# Patient Record
Sex: FEMALE | Race: WHITE | NOT HISPANIC OR LATINO
[De-identification: names, ages, dates, MRNs, and addresses within clinical notes are randomized per-mention and may not be internally consistent; named-entity substitution may affect disease eponyms.]

---

## 2021-10-21 ENCOUNTER — RESULT REVIEW (OUTPATIENT)
Age: 56
End: 2021-10-21

## 2022-05-09 ENCOUNTER — APPOINTMENT (OUTPATIENT)
Dept: ORTHOPEDIC SURGERY | Facility: CLINIC | Age: 57
End: 2022-05-09
Payer: COMMERCIAL

## 2022-05-09 VITALS — WEIGHT: 192 LBS | BODY MASS INDEX: 34.02 KG/M2 | HEIGHT: 63 IN

## 2022-05-09 DIAGNOSIS — M25.512 PAIN IN LEFT SHOULDER: ICD-10-CM

## 2022-05-09 DIAGNOSIS — Z78.9 OTHER SPECIFIED HEALTH STATUS: ICD-10-CM

## 2022-05-09 DIAGNOSIS — M75.52 BURSITIS OF LEFT SHOULDER: ICD-10-CM

## 2022-05-09 DIAGNOSIS — M75.42 IMPINGEMENT SYNDROME OF LEFT SHOULDER: ICD-10-CM

## 2022-05-09 DIAGNOSIS — Z98.890 OTHER SPECIFIED POSTPROCEDURAL STATES: ICD-10-CM

## 2022-05-09 PROBLEM — Z00.00 ENCOUNTER FOR PREVENTIVE HEALTH EXAMINATION: Status: ACTIVE | Noted: 2022-05-09

## 2022-05-09 PROCEDURE — 99072 ADDL SUPL MATRL&STAF TM PHE: CPT

## 2022-05-09 PROCEDURE — 99214 OFFICE O/P EST MOD 30 MIN: CPT

## 2022-05-09 PROCEDURE — 73030 X-RAY EXAM OF SHOULDER: CPT | Mod: LT

## 2022-05-09 NOTE — PHYSICAL EXAM
[de-identified] : Constitutional: The general appearance of the patient is well developed, well nourished, no deformities and well groomed. Normal \par \par Gait: Gait and function is as follows: normal gait. \par \par Skin: Head and neck visualized skin is normal. Left upper extremity visualized skin is normal. Right upper extremity visualized skin is normal. Thoracic Skin of the thoracic spine shows visualized skin is normal. \par \par Cardiovascular: palpable radial pulse bilaterally, good capillary refill in digits of the bilateral upper extremities and no temperature or color changes in the bilateral upper extremities. \par \par Lymphatic: Normal Palpation of lymph nodes in the cervical. \par \par Neurologic: fine motor control in the bilateral upper extremities is intact. Deep Tendon Reflexes in Upper and Lower Extremities Negative Wilson's in the bilateral upper extremities. The patient is oriented to time, place and person. Sensation to light touch intact in the bilateral upper extremities. Mood and Affect is normal. \par \par Right Shoulder: Inspection of the shoulder/upper arm is as follows: no scapula winging, no biceps deformity and no AC joint deformity. Palpation of the shoulder/upper arm is as follows: There is tenderness at the proximal biceps tendon. Range of motion of the shoulder is as follows: Pain with internal rotation, external rotation, abduction and forward flexion. Strength of the shoulder is as follows: Supraspinatus 4/5. External Rotation 4/5. Internal Rotation 4/5. Infrafraspinatus 5/5 4/5. Deltoid 5/5 Ligament Stability and Special Tests of the shoulder is as follows: Neer test is positive. Rowe' test is positive. \par \par Left Shoulder: Inspection of the shoulder/upper arm is as follows: no scapula winging, no biceps deformity and no AC joint deformity. Inspection of the wound reveals healed incision, no sign of infection. Palpation of the shoulder/upper arm is as follows: There is tenderness at the AC joint and proximal biceps tendon. Range of motion of the shoulder is as follows: Limited range of motion compatible with recent surgery. Strength of the shoulder is as follows: Supraspinatus 4/5. External Rotation 4/5. Internal Rotation 4/5. Infraspinatus 5/5 4/5. Deltoid 5/5 Ligament Stability and Special Tests of the shoulder is as follows: stable shoulder. \par \par \par Neck: \par Inspection / Palpation of the cervical spine is as follows: There is a full, pain free, stable range of motion of the cervical spine with normal tone and no tenderness to palpation. \par \par Back, including spine: Inspection / Palpation of the thoracic/lumbar spine is as follows: There is a full, pain free, stable range of motion of the thoracic spine with a normal tone and not tenderness to palpation.. \par \par \par  [Left] : left shoulder [There are no fractures, subluxations or dislocations. No significant abnormalities are seen] : There are no fractures, subluxations or dislocations. No significant abnormalities are seen [No loss of surgical correlation. Bony alignment acceptable. Hardware in appropriate position] : No loss of surgical correlation. Bony alignment acceptable. Hardware in appropriate position [Type 2 acromion] : Type 2 acromion

## 2022-05-09 NOTE — DISCUSSION/SUMMARY
[de-identified] : 57yo female who is  7 months removed from left shoulder single anchor rotator cuff repair, cyst decompression,\par SAD and mini open biceps tenodesis.\par Overall patient continues to improve as expected.\par At this point she is capable of returning to work full duty with no restrictions as of 2/20/22.\par Patient will transition to HEP\par Patient will follow up in 6 weeks.\par X-rays demonstrated no calcific tendinitis or hardware issues.\par (1) We discussed a comprehensive treatment plans that included a prescription management plan involving the\par use of prescription strength medications to include Ibuprofen 600-800 mg TID, versus 500-650 mg Tylenol. We\par also discussed prescribing topical diclofenac (Voltaren gel) as well as once daily Meloxicam 15 mg.\par (2) The patient has More Than One chronic injuries/illnesses as outlined, discussed, and documented by ICD 10\par codes listed, as well as the HPI and Plan section.\par There is a moderate risk of morbidity with further treatment, especially from use of prescription strength\par medications and possible side effects of these medications which include upset stomach and cardiac/renal issues\par with long term use were discussed.\par (3) I recommended that the patient follow-up with their medical physician to discuss any significant specific\par potential issues with long term use such as interactions with current medications or with exacerbation of\par underlying medical morbidities.\par
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
## 2023-09-20 ENCOUNTER — OFFICE (OUTPATIENT)
Facility: LOCATION | Age: 58
Setting detail: OPHTHALMOLOGY
End: 2023-09-20
Payer: COMMERCIAL

## 2023-09-20 PROBLEM — H35.033 HYPERTENSIVE RETINOPATHY; BOTH EYES: Status: ACTIVE | Noted: 2023-09-20

## 2023-09-20 PROBLEM — H25.13 CATARACT SENILE NUCLEAR SCLEROSIS; BOTH EYES: Status: ACTIVE | Noted: 2023-09-20

## 2023-09-20 PROBLEM — H52.03 HYPEROPIA; BOTH EYES: Status: ACTIVE | Noted: 2023-09-20

## 2023-09-20 PROBLEM — H43.393 VITREOUS FLOATERS; BOTH EYES: Status: ACTIVE | Noted: 2023-09-20

## 2023-09-20 PROCEDURE — LEGACY BIL LEGACY BILLING: Performed by: OPHTHALMOLOGY

## 2023-09-20 PROCEDURE — 92015 DETERMINE REFRACTIVE STATE: CPT | Performed by: OPHTHALMOLOGY

## 2023-09-20 ASSESSMENT — CONFRONTATIONAL VISUAL FIELD TEST (CVF)
OS_FINDINGS: FULL
OD_FINDINGS: FULL

## 2023-09-20 ASSESSMENT — TONOMETRY
OD_IOP_MMHG: 18
OS_IOP_MMHG: 18

## 2023-09-26 ENCOUNTER — OFFICE (OUTPATIENT)
Facility: LOCATION | Age: 58
Setting detail: OPHTHALMOLOGY
End: 2023-09-26

## 2023-09-26 PROCEDURE — 92015 DETERMINE REFRACTIVE STATE: CPT | Performed by: OPHTHALMOLOGY

## 2023-09-26 PROCEDURE — LEGACY BIL LEGACY BILLING: Performed by: OPHTHALMOLOGY

## 2023-09-27 PROBLEM — H35.013 CHANGES IN RETINAL VASCULAR APPEARANCE; BOTH EYES: Status: ACTIVE | Noted: 2023-09-20

## 2023-10-03 ASSESSMENT — REFRACTION_MANIFEST
OD_VA2: 20/20(J1+)
OD_AXIS: 175
OD_ADD: +2.50
OS_VA2: 20/20(J1+)
OS_AXIS: 10
OD_CYLINDER: +1.00
OS_CYLINDER: +1.00
OS_SPHERE: +1.00
OS_ADD: +2.50
OD_SPHERE: +1.50
OS_VA1: 20/20
OD_VA1: 20/20

## 2023-10-03 ASSESSMENT — REFRACTION_AUTOREFRACTION
OD_CYLINDER: +1.75
OS_SPHERE: +1.50
OS_AXIS: 4
OS_CYLINDER: +0.50
OD_SPHERE: +1.75
OD_AXIS: 171

## 2023-10-03 ASSESSMENT — REFRACTION_CURRENTRX
OS_CYLINDER: +0.75
OS_AXIS: 7
OD_OVR_VA: 20/
OD_SPHERE: +1.25
OD_AXIS: 8
OS_OVR_VA: 20/
OS_ADD: +2.25
OD_CYLINDER: +0.50
OS_SPHERE: +0.75
OD_ADD: +2.25
OS_VPRISM_DIRECTION: PROGS
OD_VPRISM_DIRECTION: PROGS

## 2023-10-03 ASSESSMENT — VISUAL ACUITY
OD_BCVA: 20/20-2
OS_BCVA: 20/25+2

## 2023-10-03 ASSESSMENT — SPHEQUIV_DERIVED
OS_SPHEQUIV: 1.75
OD_SPHEQUIV: 2
OS_SPHEQUIV: 1.5
OD_SPHEQUIV: 2.625

## 2024-03-26 ENCOUNTER — OFFICE (OUTPATIENT)
Facility: LOCATION | Age: 59
Setting detail: OPHTHALMOLOGY
End: 2024-03-26
Payer: COMMERCIAL

## 2024-03-26 DIAGNOSIS — H40.013: ICD-10-CM

## 2024-03-26 DIAGNOSIS — H25.13: ICD-10-CM

## 2024-03-26 DIAGNOSIS — H35.033: ICD-10-CM

## 2024-03-26 PROCEDURE — 92014 COMPRE OPH EXAM EST PT 1/>: CPT | Performed by: OPHTHALMOLOGY

## 2024-03-26 PROCEDURE — 92250 FUNDUS PHOTOGRAPHY W/I&R: CPT | Performed by: OPHTHALMOLOGY

## 2024-03-26 ASSESSMENT — REFRACTION_MANIFEST
OD_VA2: 20/20(J1+)
OD_AXIS: 175
OS_AXIS: 10
OS_VA1: 20/20
OS_SPHERE: +1.00
OS_ADD: +2.50
OD_SPHERE: +1.50
OD_VA1: 20/20
OD_ADD: +2.50
OD_CYLINDER: +1.00
OS_CYLINDER: +1.00
OS_VA2: 20/20(J1+)

## 2024-03-26 ASSESSMENT — REFRACTION_CURRENTRX
OD_AXIS: 176
OD_CYLINDER: +0.75
OD_ADD: +2.50
OS_SPHERE: +1.00
OS_CYLINDER: +1.00
OS_OVR_VA: 20/
OD_VPRISM_DIRECTION: PROGS
OS_AXIS: 007
OS_ADD: +2.50
OD_SPHERE: +1.75
OD_OVR_VA: 20/
OS_VPRISM_DIRECTION: PROGS

## 2024-03-26 ASSESSMENT — SPHEQUIV_DERIVED
OS_SPHEQUIV: 1.5
OD_SPHEQUIV: 2

## 2024-08-05 ENCOUNTER — TRANSCRIPTION ENCOUNTER (OUTPATIENT)
Age: 59
End: 2024-08-05

## 2024-08-05 ENCOUNTER — APPOINTMENT (OUTPATIENT)
Dept: ORTHOPEDIC SURGERY | Facility: CLINIC | Age: 59
End: 2024-08-05

## 2024-08-05 PROBLEM — M25.511 ACUTE PAIN OF RIGHT SHOULDER: Status: ACTIVE | Noted: 2024-08-05

## 2024-08-05 PROBLEM — M75.51 BURSITIS OF RIGHT SHOULDER: Status: ACTIVE | Noted: 2024-08-05

## 2024-08-05 PROBLEM — M75.121 COMPLETE TEAR OF RIGHT ROTATOR CUFF: Status: ACTIVE | Noted: 2024-08-05

## 2024-08-05 PROBLEM — M75.21 TENDONITIS OF UPPER BICEPS TENDON OF RIGHT SHOULDER: Status: ACTIVE | Noted: 2024-08-05

## 2024-08-05 PROCEDURE — 99214 OFFICE O/P EST MOD 30 MIN: CPT

## 2024-08-05 PROCEDURE — 73030 X-RAY EXAM OF SHOULDER: CPT | Mod: RT

## 2024-08-05 NOTE — WORK
[Torn Ligament/Tendon/Muscle] : torn ligament, tendon or muscle [Was the competent medical cause of the injury] : was the competent medical cause of the injury [Are consistent with the injury] : are consistent with the injury [Severe Partial] : severe partial [FreeTextEntry1] : fair

## 2024-08-05 NOTE — HISTORY OF PRESENT ILLNESS
[de-identified] : 59-year-old female presenting for repeat evaluation of right shoulder pain from original work injury that occurred 9/23/2020.  Patient was employed at [employer] At the time of the injury continues to work full duty with no restrictions.  She was initially under the care of Dr. Boyce and has been treated with multiple cortisone injections as well as physical therapy.  She was previously evaluated September 2021 in which she was indicated for right shoulder arthroscopic rotator cuff repair but surgery was denied.  Overall pain has been getting progressively worse.  Pain is constant and severe.  She has noticed worsening functional weakness.  She wishes to discuss the possibility of definitive management.

## 2024-08-05 NOTE — HISTORY OF PRESENT ILLNESS
[de-identified] : 59-year-old female presenting for repeat evaluation of right shoulder pain from original work injury that occurred 9/23/2020.  Patient was employed at [employer] At the time of the injury continues to work full duty with no restrictions.  She was initially under the care of Dr. Boyce and has been treated with multiple cortisone injections as well as physical therapy.  She was previously evaluated September 2021 in which she was indicated for right shoulder arthroscopic rotator cuff repair but surgery was denied.  Overall pain has been getting progressively worse.  Pain is constant and severe.  She has noticed worsening functional weakness.  She wishes to discuss the possibility of definitive management.

## 2024-08-05 NOTE — PHYSICAL EXAM
[Right] : right shoulder [There are no fractures, subluxations or dislocations. No significant abnormalities are seen] : There are no fractures, subluxations or dislocations. No significant abnormalities are seen [Type 2 acromion] : Type 2 acromion [de-identified] : Constitutional: The general appearance of the patient is well developed, well nourished, no deformities and well groomed. Normal   Gait: Gait and function is as follows: normal gait.   Skin: Head and neck visualized skin is normal. Left upper extremity visualized skin is normal. Right upper extremity visualized skin is normal. Thoracic Skin of the thoracic spine shows visualized skin is normal.   Cardiovascular: palpable radial pulse bilaterally, good capillary refill in digits of the bilateral upper extremities and no temperature or color changes in the bilateral upper extremities.   Lymphatic: Normal Palpation of lymph nodes in the cervical.   Neurologic: fine motor control in the bilateral upper extremities is intact. Deep Tendon Reflexes in Upper and Lower Extremities Negative Wilson's in the bilateral upper extremities. The patient is oriented to time, place and person. Sensation to light touch intact in the bilateral upper extremities. Mood and Affect is normal.   Right Shoulder: Inspection of the shoulder/upper arm is as follows: Stable shoulder. Palpation of the shoulder/upper arm is as follows: There is tenderness at the AC joint, proximal biceps tendon and supraspinatus tendon. Range of motion of the shoulder is as follows: Pain with internal rotation, external rotation, abduction and forward flexion. Strength of the shoulder is as follows: Supraspinatus 4/5. External Rotation 4/5. Internal Rotation 4/5. Infraspinatus 5/5 4/5. Deltoid 5/5 Ligament Stability and Special Tests of the shoulder is as follows: Neer test is positive. Rowe' test is positive. Speed's test is positive.   Left Shoulder: Inspection of the shoulder/upper arm is as follows: There is a full, pain-free, stable range of motion of the shoulder with normal strength and no tenderness to palpation.   Neck: Inspection / Palpation of the cervical spine is as follows: There is a mild restricted range of motion of the cervical spine. Increase tone and mild tenderness to palpation. Stable ROM of cervical spine.   Back, including spine: Inspection / Palpation of the thoracic/lumbar spine is as follows: There is a full, pain free, stable range of motion of the thoracic spine with a normal tone and not tenderness to palpation..

## 2024-08-05 NOTE — PHYSICAL EXAM
[Right] : right shoulder [There are no fractures, subluxations or dislocations. No significant abnormalities are seen] : There are no fractures, subluxations or dislocations. No significant abnormalities are seen [Type 2 acromion] : Type 2 acromion [de-identified] : Constitutional: The general appearance of the patient is well developed, well nourished, no deformities and well groomed. Normal   Gait: Gait and function is as follows: normal gait.   Skin: Head and neck visualized skin is normal. Left upper extremity visualized skin is normal. Right upper extremity visualized skin is normal. Thoracic Skin of the thoracic spine shows visualized skin is normal.   Cardiovascular: palpable radial pulse bilaterally, good capillary refill in digits of the bilateral upper extremities and no temperature or color changes in the bilateral upper extremities.   Lymphatic: Normal Palpation of lymph nodes in the cervical.   Neurologic: fine motor control in the bilateral upper extremities is intact. Deep Tendon Reflexes in Upper and Lower Extremities Negative Wilson's in the bilateral upper extremities. The patient is oriented to time, place and person. Sensation to light touch intact in the bilateral upper extremities. Mood and Affect is normal.   Right Shoulder: Inspection of the shoulder/upper arm is as follows: Stable shoulder. Palpation of the shoulder/upper arm is as follows: There is tenderness at the AC joint, proximal biceps tendon and supraspinatus tendon. Range of motion of the shoulder is as follows: Pain with internal rotation, external rotation, abduction and forward flexion. Strength of the shoulder is as follows: Supraspinatus 4/5. External Rotation 4/5. Internal Rotation 4/5. Infraspinatus 5/5 4/5. Deltoid 5/5 Ligament Stability and Special Tests of the shoulder is as follows: Neer test is positive. Rowe' test is positive. Speed's test is positive.   Left Shoulder: Inspection of the shoulder/upper arm is as follows: There is a full, pain-free, stable range of motion of the shoulder with normal strength and no tenderness to palpation.   Neck: Inspection / Palpation of the cervical spine is as follows: There is a mild restricted range of motion of the cervical spine. Increase tone and mild tenderness to palpation. Stable ROM of cervical spine.   Back, including spine: Inspection / Palpation of the thoracic/lumbar spine is as follows: There is a full, pain free, stable range of motion of the thoracic spine with a normal tone and not tenderness to palpation..

## 2024-08-05 NOTE — DISCUSSION/SUMMARY
[de-identified] : 59-year-old female with ongoing right shoulder pain from original work injury that occurred 9/23/2020.  Patient was initially under the care of Dr. Boyce and has failed multiple years of physical therapy, multiple injections and activity modification. She has history of high-grade partial-thickness (near full-thickness) supraspinatus tear as well as partially tear of the long head of biceps tendon.  Previous request for surgery was denied. At this point patient has continuously failed all conservative management and reports pain has been getting progressively worse.  She is having difficulty completing activities of daily living at this point. Updated x-rays today are nondiagnostic. We are recommending MRI to rule out further progression of rotator cuff tear and atrophy in an effort to schedule surgery.  Patient will likely be amenable to arthroscopic rotator cuff repair, subacromial decompression and bicep tenodesis.  Patient will follow-up in 4 weeks for repeat evaluation and to review MRI results in an effort to discuss surgery in greater detail.  She may continue to work full duty no restrictions.  (1) We discussed a comprehensive treatment plans that included a prescription management plan involving the use of prescription strength medications to include Ibuprofen 600-800 mg TID, versus 500-650 mg Tylenol. We also discussed prescribing topical diclofenac (Voltaren gel) as well as once daily Meloxicam 15 mg.  (2) The patient has More Than One chronic injuries/illnesses as outlined, discussed, and documented by ICD 10 codes listed, as well as the HPI and Plan section. There is a moderate risk of morbidity with further treatment, especially from use of prescription strength medications and possible side effects of these medications which include upset stomach and cardiac/renal issues with long term use were discussed.  (3) I recommended that the patient follow-up with their medical physician to discuss any significant specific potential issues with long term use such as interactions with current medications or with exacerbation of underlying medical morbidities.

## 2024-08-05 NOTE — DISCUSSION/SUMMARY
[de-identified] : 59-year-old female with ongoing right shoulder pain from original work injury that occurred 9/23/2020.  Patient was initially under the care of Dr. Boyce and has failed multiple years of physical therapy, multiple injections and activity modification. She has history of high-grade partial-thickness (near full-thickness) supraspinatus tear as well as partially tear of the long head of biceps tendon.  Previous request for surgery was denied. At this point patient has continuously failed all conservative management and reports pain has been getting progressively worse.  She is having difficulty completing activities of daily living at this point. Updated x-rays today are nondiagnostic. We are recommending MRI to rule out further progression of rotator cuff tear and atrophy in an effort to schedule surgery.  Patient will likely be amenable to arthroscopic rotator cuff repair, subacromial decompression and bicep tenodesis.  Patient will follow-up in 4 weeks for repeat evaluation and to review MRI results in an effort to discuss surgery in greater detail.  She may continue to work full duty no restrictions.  (1) We discussed a comprehensive treatment plans that included a prescription management plan involving the use of prescription strength medications to include Ibuprofen 600-800 mg TID, versus 500-650 mg Tylenol. We also discussed prescribing topical diclofenac (Voltaren gel) as well as once daily Meloxicam 15 mg.  (2) The patient has More Than One chronic injuries/illnesses as outlined, discussed, and documented by ICD 10 codes listed, as well as the HPI and Plan section. There is a moderate risk of morbidity with further treatment, especially from use of prescription strength medications and possible side effects of these medications which include upset stomach and cardiac/renal issues with long term use were discussed.  (3) I recommended that the patient follow-up with their medical physician to discuss any significant specific potential issues with long term use such as interactions with current medications or with exacerbation of underlying medical morbidities.

## 2024-08-22 ENCOUNTER — RESULT REVIEW (OUTPATIENT)
Age: 59
End: 2024-08-22

## 2024-09-09 ENCOUNTER — APPOINTMENT (OUTPATIENT)
Dept: ORTHOPEDIC SURGERY | Facility: CLINIC | Age: 59
End: 2024-09-09
Payer: OTHER MISCELLANEOUS

## 2024-09-09 DIAGNOSIS — M75.21 BICIPITAL TENDINITIS, RIGHT SHOULDER: ICD-10-CM

## 2024-09-09 DIAGNOSIS — M75.51 BURSITIS OF RIGHT SHOULDER: ICD-10-CM

## 2024-09-09 DIAGNOSIS — M75.121 COMPLETE ROTATOR CUFF TEAR OR RUPTURE OF RIGHT SHOULDER, NOT SPECIFIED AS TRAUMATIC: ICD-10-CM

## 2024-09-09 DIAGNOSIS — M25.511 PAIN IN RIGHT SHOULDER: ICD-10-CM

## 2024-09-09 PROCEDURE — 99214 OFFICE O/P EST MOD 30 MIN: CPT

## 2024-09-09 NOTE — HISTORY OF PRESENT ILLNESS
[de-identified] : 59-year-old female presenting for repeat evaluation of right shoulder pain from original work injury that occurred 9/23/2020.  Patient was employed at [employer] At the time of the injury continues to work full duty with no restrictions.  She was initially under the care of Dr. oByce and has been treated with multiple cortisone injections as well as physical therapy.  She was previously evaluated September 2021 in which she was indicated for right shoulder arthroscopic rotator cuff repair but surgery was denied.  Overall pain has been getting progressively worse.  Pain is constant and severe.  She has noticed worsening functional weakness.  She wishes to discuss the possibility of definitive management.    09/09/2024: Patient here to follow up on right shoulder pain. Patient had an MRI of the right shoulder. Patient here to review MRI.

## 2024-09-09 NOTE — HISTORY OF PRESENT ILLNESS
[de-identified] : 59-year-old female presenting for repeat evaluation of right shoulder pain from original work injury that occurred 9/23/2020.  Patient was employed at [employer] At the time of the injury continues to work full duty with no restrictions.  She was initially under the care of Dr. Boyce and has been treated with multiple cortisone injections as well as physical therapy.  She was previously evaluated September 2021 in which she was indicated for right shoulder arthroscopic rotator cuff repair but surgery was denied.  Overall pain has been getting progressively worse.  Pain is constant and severe.  She has noticed worsening functional weakness.  She wishes to discuss the possibility of definitive management.    09/09/2024: Patient here to follow up on right shoulder pain. Patient had an MRI of the right shoulder. Patient here to review MRI.

## 2024-09-09 NOTE — DATA REVIEWED
[MRI] : MRI [Right] : of the right [Shoulder] : shoulder [I independently reviewed and interpreted images and report] : I independently reviewed and interpreted images and report [FreeTextEntry1] : IMPRESSION of 08/24 MRI: Full-thickness tear in anterior aspect of the supraspinatus tendon spanning 1 cm in AP dimension. Findings are new compared to previous examination. Full-thickness and high-grade tearing in the subscapularis tendon with medial subluxation of the long head of biceps tendon deep to the tear, new since previous examination.. Tendinosis in the horizontal portion of the long head of biceps tendon. Tear of the superior labrum with partial tearing the biceps labral anchor.  Severe AC joint arthrosis.

## 2024-09-09 NOTE — WORK
[Torn Ligament/Tendon/Muscle] : torn ligament, tendon or muscle [Was the competent medical cause of the injury] : was the competent medical cause of the injury [Are consistent with the injury] : are consistent with the injury [Severe Partial] : severe partial [Pulling/Pushing] : pulling/pushing [Reaching overhead] : reaching overhead [FreeTextEntry1] : fair

## 2024-09-09 NOTE — PHYSICAL EXAM
[Right] : right shoulder [There are no fractures, subluxations or dislocations. No significant abnormalities are seen] : There are no fractures, subluxations or dislocations. No significant abnormalities are seen [Type 2 acromion] : Type 2 acromion [de-identified] : Constitutional: The general appearance of the patient is well developed, well nourished, no deformities and well groomed. Normal   Gait: Gait and function is as follows: normal gait.   Skin: Head and neck visualized skin is normal. Left upper extremity visualized skin is normal. Right upper extremity visualized skin is normal. Thoracic Skin of the thoracic spine shows visualized skin is normal.   Cardiovascular: palpable radial pulse bilaterally, good capillary refill in digits of the bilateral upper extremities and no temperature or color changes in the bilateral upper extremities.   Lymphatic: Normal Palpation of lymph nodes in the cervical.   Neurologic: fine motor control in the bilateral upper extremities is intact. Deep Tendon Reflexes in Upper and Lower Extremities Negative Wilson's in the bilateral upper extremities. The patient is oriented to time, place and person. Sensation to light touch intact in the bilateral upper extremities. Mood and Affect is normal.   Right Shoulder: Inspection of the shoulder/upper arm is as follows: Stable shoulder. Palpation of the shoulder/upper arm is as follows: There is tenderness at the AC joint, proximal biceps tendon and supraspinatus tendon. Range of motion of the shoulder is as follows: Pain with internal rotation, external rotation, abduction and forward flexion. Strength of the shoulder is as follows: Supraspinatus 4/5. External Rotation 4/5. Internal Rotation 4/5. Infraspinatus 5/5 4/5. Deltoid 5/5 Ligament Stability and Special Tests of the shoulder is as follows: Neer test is positive. Rowe' test is positive. Speed's test is positive.   Left Shoulder: Inspection of the shoulder/upper arm is as follows: There is a full, pain-free, stable range of motion of the shoulder with normal strength and no tenderness to palpation.   Neck: Inspection / Palpation of the cervical spine is as follows: There is a mild restricted range of motion of the cervical spine. Increase tone and mild tenderness to palpation. Stable ROM of cervical spine.   Back, including spine: Inspection / Palpation of the thoracic/lumbar spine is as follows: There is a full, pain free, stable range of motion of the thoracic spine with a normal tone and not tenderness to palpation..

## 2024-09-09 NOTE — DISCUSSION/SUMMARY
[de-identified] : 59-year-old female with ongoing right shoulder pain from original work injury that occurred 9/23/2020.  Patient was initially under the care of Dr. Boyce and has failed multiple years of physical therapy, multiple injections and activity modification.  MRI of the right shoulder MRI demonstrates a Full-thickness tear in anterior aspect of the supraspinatus tendon spanning 1 cm in AP dimension and Full-thickness and high-grade tearing in the subscapularis tendon with medial subluxation of the long head of biceps tendon.  At this point patient has continuously failed all conservative management and reports pain has been getting progressively worse.   She is having difficulty completing activities of daily living at this point. Pt has a full thickness rotator cuff tear as injury to the biceps-labral complex and continues to report pain and weakness despite more than 3 months of conservative treatment including focused HEP/therapy, injections, anti-inflammatory medication and activity modification.   The patient is interested in pursuing surgical treatment. We had a lengthy discussion about the nature of arthroscopic rotator cuff repair surgery including the likelihood of addressing other pathology with arthroscopic vs mini-open biceps tenodesis, subacromial decompression, and extensive debridement of any pathologic tissue encountered at the time of surgery. I discussed the risks and benefits of both operative and non-operative treatment. I explained in detail the postoperative recovery including the duration of sling use and expectation for postoperative physical therapy. Explained that there is no guarantee however there is a high likelihood of functional improvement and pain relief. The patient understood all this was discussed.   The patient is indicated for a Right shoulder arthroscopic rotator cuff repair, biceps tenodesis and subacromial decompression.   * Surgery: Considering patient has failed all conservative treatment we are requesting authorization for:  - Right shoulder arthroscopic rotator cuff repair (CPT code 26880), mini open biceps biceps tenodesis (CPT 05825), debridement (CPT 91646) Subacromial decompression (75332)   Pt would like surgery (as soon as possible) The patient will require a Stone Arc 2.0 brace, cryotherapy, and 12 weeks of post-operative physical therapy The patient will require a medical clearance The doctor will require the [employer] representative, one hour, and one assistant.  She may continue to work full duty no restrictions.  (1) We discussed a comprehensive treatment plans that included a prescription management plan involving the use of prescription strength medications to include Ibuprofen 600-800 mg TID, versus 500-650 mg Tylenol. We also discussed prescribing topical diclofenac (Voltaren gel) as well as once daily Meloxicam 15 mg.  (2) The patient has More Than One chronic injuries/illnesses as outlined, discussed, and documented by ICD 10 codes listed, as well as the HPI and Plan section. There is a moderate risk of morbidity with further treatment, especially from use of prescription strength medications and possible side effects of these medications which include upset stomach and cardiac/renal issues with long term use were discussed.  (3) I recommended that the patient follow-up with their medical physician to discuss any significant specific potential issues with long term use such as interactions with current medications or with exacerbation of underlying medical morbidities.

## 2024-09-09 NOTE — PHYSICAL EXAM
[Right] : right shoulder [There are no fractures, subluxations or dislocations. No significant abnormalities are seen] : There are no fractures, subluxations or dislocations. No significant abnormalities are seen [Type 2 acromion] : Type 2 acromion [de-identified] : Constitutional: The general appearance of the patient is well developed, well nourished, no deformities and well groomed. Normal   Gait: Gait and function is as follows: normal gait.   Skin: Head and neck visualized skin is normal. Left upper extremity visualized skin is normal. Right upper extremity visualized skin is normal. Thoracic Skin of the thoracic spine shows visualized skin is normal.   Cardiovascular: palpable radial pulse bilaterally, good capillary refill in digits of the bilateral upper extremities and no temperature or color changes in the bilateral upper extremities.   Lymphatic: Normal Palpation of lymph nodes in the cervical.   Neurologic: fine motor control in the bilateral upper extremities is intact. Deep Tendon Reflexes in Upper and Lower Extremities Negative Wilson's in the bilateral upper extremities. The patient is oriented to time, place and person. Sensation to light touch intact in the bilateral upper extremities. Mood and Affect is normal.   Right Shoulder: Inspection of the shoulder/upper arm is as follows: Stable shoulder. Palpation of the shoulder/upper arm is as follows: There is tenderness at the AC joint, proximal biceps tendon and supraspinatus tendon. Range of motion of the shoulder is as follows: Pain with internal rotation, external rotation, abduction and forward flexion. Strength of the shoulder is as follows: Supraspinatus 4/5. External Rotation 4/5. Internal Rotation 4/5. Infraspinatus 5/5 4/5. Deltoid 5/5 Ligament Stability and Special Tests of the shoulder is as follows: Neer test is positive. Rowe' test is positive. Speed's test is positive.   Left Shoulder: Inspection of the shoulder/upper arm is as follows: There is a full, pain-free, stable range of motion of the shoulder with normal strength and no tenderness to palpation.   Neck: Inspection / Palpation of the cervical spine is as follows: There is a mild restricted range of motion of the cervical spine. Increase tone and mild tenderness to palpation. Stable ROM of cervical spine.   Back, including spine: Inspection / Palpation of the thoracic/lumbar spine is as follows: There is a full, pain free, stable range of motion of the thoracic spine with a normal tone and not tenderness to palpation..

## 2024-09-09 NOTE — DISCUSSION/SUMMARY
[de-identified] : 59-year-old female with ongoing right shoulder pain from original work injury that occurred 9/23/2020.  Patient was initially under the care of Dr. Boyce and has failed multiple years of physical therapy, multiple injections and activity modification.  MRI of the right shoulder MRI demonstrates a Full-thickness tear in anterior aspect of the supraspinatus tendon spanning 1 cm in AP dimension and Full-thickness and high-grade tearing in the subscapularis tendon with medial subluxation of the long head of biceps tendon.  At this point patient has continuously failed all conservative management and reports pain has been getting progressively worse.   She is having difficulty completing activities of daily living at this point. Pt has a full thickness rotator cuff tear as injury to the biceps-labral complex and continues to report pain and weakness despite more than 3 months of conservative treatment including focused HEP/therapy, injections, anti-inflammatory medication and activity modification.   The patient is interested in pursuing surgical treatment. We had a lengthy discussion about the nature of arthroscopic rotator cuff repair surgery including the likelihood of addressing other pathology with arthroscopic vs mini-open biceps tenodesis, subacromial decompression, and extensive debridement of any pathologic tissue encountered at the time of surgery. I discussed the risks and benefits of both operative and non-operative treatment. I explained in detail the postoperative recovery including the duration of sling use and expectation for postoperative physical therapy. Explained that there is no guarantee however there is a high likelihood of functional improvement and pain relief. The patient understood all this was discussed.   The patient is indicated for a Right shoulder arthroscopic rotator cuff repair, biceps tenodesis and subacromial decompression.   * Surgery: Considering patient has failed all conservative treatment we are requesting authorization for:  - Right shoulder arthroscopic rotator cuff repair (CPT code 43211), mini open biceps biceps tenodesis (CPT 20260), debridement (CPT 46689) Subacromial decompression (82917)   Pt would like surgery (as soon as possible) The patient will require a Blanco Arc 2.0 brace, cryotherapy, and 12 weeks of post-operative physical therapy The patient will require a medical clearance The doctor will require the [employer] representative, one hour, and one assistant.  She may continue to work full duty no restrictions.  (1) We discussed a comprehensive treatment plans that included a prescription management plan involving the use of prescription strength medications to include Ibuprofen 600-800 mg TID, versus 500-650 mg Tylenol. We also discussed prescribing topical diclofenac (Voltaren gel) as well as once daily Meloxicam 15 mg.  (2) The patient has More Than One chronic injuries/illnesses as outlined, discussed, and documented by ICD 10 codes listed, as well as the HPI and Plan section. There is a moderate risk of morbidity with further treatment, especially from use of prescription strength medications and possible side effects of these medications which include upset stomach and cardiac/renal issues with long term use were discussed.  (3) I recommended that the patient follow-up with their medical physician to discuss any significant specific potential issues with long term use such as interactions with current medications or with exacerbation of underlying medical morbidities.

## 2024-11-04 ENCOUNTER — APPOINTMENT (OUTPATIENT)
Dept: ORTHOPEDIC SURGERY | Facility: CLINIC | Age: 59
End: 2024-11-04
Payer: OTHER MISCELLANEOUS

## 2024-11-04 DIAGNOSIS — M25.511 PAIN IN RIGHT SHOULDER: ICD-10-CM

## 2024-11-04 DIAGNOSIS — M75.51 BURSITIS OF RIGHT SHOULDER: ICD-10-CM

## 2024-11-04 DIAGNOSIS — M75.21 BICIPITAL TENDINITIS, RIGHT SHOULDER: ICD-10-CM

## 2024-11-04 DIAGNOSIS — M75.121 COMPLETE ROTATOR CUFF TEAR OR RUPTURE OF RIGHT SHOULDER, NOT SPECIFIED AS TRAUMATIC: ICD-10-CM

## 2024-11-04 PROCEDURE — 99214 OFFICE O/P EST MOD 30 MIN: CPT

## 2024-11-04 PROCEDURE — L3960: CPT | Mod: RT

## 2024-11-08 RX ORDER — OXYCODONE 5 MG/1
5 TABLET ORAL
Qty: 30 | Refills: 0 | Status: ACTIVE | COMMUNITY
Start: 2024-11-08 | End: 1900-01-01

## 2024-11-14 ENCOUNTER — APPOINTMENT (OUTPATIENT)
Dept: ORTHOPEDIC SURGERY | Facility: HOSPITAL | Age: 59
End: 2024-11-14
Payer: OTHER MISCELLANEOUS

## 2024-11-14 PROCEDURE — 29821 SHO ARTHRS SRG COMPL SYNVCT: CPT | Mod: AS,59,RT

## 2024-11-14 PROCEDURE — 29823 SHO ARTHRS SRG XTNSV DBRDMT: CPT | Mod: AS,59,RT

## 2024-11-14 PROCEDURE — 23430 REPAIR BICEPS TENDON: CPT | Mod: AS,59,RT

## 2024-11-14 PROCEDURE — 29827 SHO ARTHRS SRG RT8TR CUF RPR: CPT | Mod: RT

## 2024-11-14 PROCEDURE — 29826 SHO ARTHRS SRG DECOMPRESSION: CPT | Mod: AS,RT

## 2024-11-14 PROCEDURE — 29827 SHO ARTHRS SRG RT8TR CUF RPR: CPT | Mod: AS,RT

## 2024-11-14 PROCEDURE — 29821 SHO ARTHRS SRG COMPL SYNVCT: CPT | Mod: 59,RT

## 2024-11-14 PROCEDURE — 29823 SHO ARTHRS SRG XTNSV DBRDMT: CPT | Mod: 59,RT

## 2024-11-14 PROCEDURE — 29826 SHO ARTHRS SRG DECOMPRESSION: CPT | Mod: RT

## 2024-11-14 PROCEDURE — 23430 REPAIR BICEPS TENDON: CPT | Mod: 59,RT

## 2024-11-22 ENCOUNTER — APPOINTMENT (OUTPATIENT)
Dept: ORTHOPEDIC SURGERY | Facility: CLINIC | Age: 59
End: 2024-11-22
Payer: OTHER MISCELLANEOUS

## 2024-11-22 DIAGNOSIS — M75.51 BURSITIS OF RIGHT SHOULDER: ICD-10-CM

## 2024-11-22 DIAGNOSIS — M25.511 PAIN IN RIGHT SHOULDER: ICD-10-CM

## 2024-11-22 DIAGNOSIS — M75.121 COMPLETE ROTATOR CUFF TEAR OR RUPTURE OF RIGHT SHOULDER, NOT SPECIFIED AS TRAUMATIC: ICD-10-CM

## 2024-11-22 PROCEDURE — 99024 POSTOP FOLLOW-UP VISIT: CPT

## 2024-12-20 ENCOUNTER — APPOINTMENT (OUTPATIENT)
Dept: ORTHOPEDIC SURGERY | Facility: CLINIC | Age: 59
End: 2024-12-20
Payer: OTHER MISCELLANEOUS

## 2024-12-20 DIAGNOSIS — Z98.890 OTHER SPECIFIED POSTPROCEDURAL STATES: ICD-10-CM

## 2024-12-20 PROCEDURE — 99024 POSTOP FOLLOW-UP VISIT: CPT

## 2025-01-31 ENCOUNTER — APPOINTMENT (OUTPATIENT)
Dept: ORTHOPEDIC SURGERY | Facility: CLINIC | Age: 60
End: 2025-01-31
Payer: OTHER MISCELLANEOUS

## 2025-01-31 DIAGNOSIS — M75.21 BICIPITAL TENDINITIS, RIGHT SHOULDER: ICD-10-CM

## 2025-01-31 DIAGNOSIS — M25.511 PAIN IN RIGHT SHOULDER: ICD-10-CM

## 2025-01-31 DIAGNOSIS — M75.121 COMPLETE ROTATOR CUFF TEAR OR RUPTURE OF RIGHT SHOULDER, NOT SPECIFIED AS TRAUMATIC: ICD-10-CM

## 2025-01-31 PROCEDURE — 99024 POSTOP FOLLOW-UP VISIT: CPT

## 2025-03-17 ENCOUNTER — APPOINTMENT (OUTPATIENT)
Dept: ORTHOPEDIC SURGERY | Facility: CLINIC | Age: 60
End: 2025-03-17

## 2025-03-17 ENCOUNTER — APPOINTMENT (OUTPATIENT)
Dept: ORTHOPEDIC SURGERY | Facility: CLINIC | Age: 60
End: 2025-03-17
Payer: OTHER MISCELLANEOUS

## 2025-03-17 DIAGNOSIS — M75.21 BICIPITAL TENDINITIS, RIGHT SHOULDER: ICD-10-CM

## 2025-03-17 DIAGNOSIS — M25.511 PAIN IN RIGHT SHOULDER: ICD-10-CM

## 2025-03-17 DIAGNOSIS — M75.42 IMPINGEMENT SYNDROME OF LEFT SHOULDER: ICD-10-CM

## 2025-03-17 DIAGNOSIS — M75.121 COMPLETE ROTATOR CUFF TEAR OR RUPTURE OF RIGHT SHOULDER, NOT SPECIFIED AS TRAUMATIC: ICD-10-CM

## 2025-03-17 PROCEDURE — 99214 OFFICE O/P EST MOD 30 MIN: CPT

## 2025-08-21 ENCOUNTER — OFFICE (OUTPATIENT)
Dept: URBAN - METROPOLITAN AREA CLINIC 103 | Facility: CLINIC | Age: 60
Setting detail: OPHTHALMOLOGY
End: 2025-08-21
Payer: COMMERCIAL

## 2025-08-21 DIAGNOSIS — H52.4: ICD-10-CM

## 2025-08-21 DIAGNOSIS — H43.393: ICD-10-CM

## 2025-08-21 DIAGNOSIS — H25.13: ICD-10-CM

## 2025-08-21 DIAGNOSIS — H40.013: ICD-10-CM

## 2025-08-21 DIAGNOSIS — H35.033: ICD-10-CM

## 2025-08-21 PROCEDURE — 92014 COMPRE OPH EXAM EST PT 1/>: CPT | Performed by: OPHTHALMOLOGY

## 2025-08-21 PROCEDURE — 92133 CPTRZD OPH DX IMG PST SGM ON: CPT | Performed by: OPHTHALMOLOGY

## 2025-08-21 PROCEDURE — 92020 GONIOSCOPY: CPT | Performed by: OPHTHALMOLOGY

## 2025-08-21 PROCEDURE — 92083 EXTENDED VISUAL FIELD XM: CPT | Performed by: OPHTHALMOLOGY

## 2025-08-21 PROCEDURE — 92015 DETERMINE REFRACTIVE STATE: CPT | Performed by: OPHTHALMOLOGY

## 2025-08-21 ASSESSMENT — TONOMETRY
OD_IOP_MMHG: 20
OS_IOP_MMHG: 18

## 2025-08-21 ASSESSMENT — REFRACTION_AUTOREFRACTION
OD_AXIS: 082
OS_SPHERE: +3.00
OS_CYLINDER: -1.50
OS_AXIS: 099
OD_SPHERE: +4.25
OD_CYLINDER: -2.50

## 2025-08-21 ASSESSMENT — KERATOMETRY
OS_AXISANGLE_DEGREES: 173
OD_K2POWER_DIOPTERS: 43.50
OS_K2POWER_DIOPTERS: 43.50
OS_K1POWER_DIOPTERS: 42.25
OD_K1POWER_DIOPTERS: 42.50
OD_AXISANGLE_DEGREES: 005

## 2025-08-21 ASSESSMENT — REFRACTION_CURRENTRX
OD_VPRISM_DIRECTION: PROGS
OD_ADD: +2.25
OS_CYLINDER: +1.00
OD_CYLINDER: -1.00
OD_SPHERE: +1.75
OS_OVR_VA: 20/
OD_CYLINDER: +0.75
OS_AXIS: 091
OS_OVR_VA: 20/
OS_ADD: +2.50
OS_ADD: +2.25
OS_SPHERE: +1.00
OD_OVR_VA: 20/
OS_AXIS: 007
OD_OVR_VA: 20/
OD_ADD: +2.50
OS_VPRISM_DIRECTION: PROGS
OD_AXIS: 084
OS_CYLINDER: -1.00
OD_AXIS: 176
OD_SPHERE: +2.50
OS_SPHERE: +2.25

## 2025-08-21 ASSESSMENT — REFRACTION_MANIFEST
OS_AXIS: 095
OD_AXIS: 085
OS_ADD: +2.50
OS_SPHERE: +2.75
OD_ADD: +2.50
OS_VA1: 20/20
OS_VA2: 20/20(J1+)
OD_VA2: 20/20(J1+)
OD_CYLINDER: -1.00
OD_VA1: 20/25+
OD_SPHERE: +3.00
OS_CYLINDER: -1.25

## 2025-08-21 ASSESSMENT — CONFRONTATIONAL VISUAL FIELD TEST (CVF)
OD_FINDINGS: FULL
OS_FINDINGS: FULL

## 2025-08-21 ASSESSMENT — VISUAL ACUITY
OD_BCVA: 20/25+2
OS_BCVA: 20/25-1